# Patient Record
Sex: FEMALE | Race: WHITE | NOT HISPANIC OR LATINO | Employment: FULL TIME | URBAN - METROPOLITAN AREA
[De-identification: names, ages, dates, MRNs, and addresses within clinical notes are randomized per-mention and may not be internally consistent; named-entity substitution may affect disease eponyms.]

---

## 2017-10-01 ENCOUNTER — OFFICE VISIT (OUTPATIENT)
Dept: URGENT CARE | Facility: URGENT CARE | Age: 22
End: 2017-10-01
Payer: COMMERCIAL

## 2017-10-01 VITALS
WEIGHT: 143 LBS | RESPIRATION RATE: 16 BRPM | TEMPERATURE: 98.1 F | HEART RATE: 66 BPM | OXYGEN SATURATION: 98 % | DIASTOLIC BLOOD PRESSURE: 73 MMHG | HEIGHT: 62 IN | SYSTOLIC BLOOD PRESSURE: 122 MMHG | BODY MASS INDEX: 26.31 KG/M2

## 2017-10-01 DIAGNOSIS — R30.0 DYSURIA: Primary | ICD-10-CM

## 2017-10-01 LAB
RBC #/AREA URNS AUTO: NORMAL /HPF
URNS CMNT MICRO: NORMAL
WBC #/AREA URNS AUTO: NORMAL /HPF

## 2017-10-01 PROCEDURE — 81015 MICROSCOPIC EXAM OF URINE: CPT | Performed by: HOSPITALIST

## 2017-10-01 PROCEDURE — 99213 OFFICE O/P EST LOW 20 MIN: CPT | Performed by: HOSPITALIST

## 2017-10-01 RX ORDER — SULFAMETHOXAZOLE AND TRIMETHOPRIM 400; 80 MG/1; MG/1
1 TABLET ORAL 2 TIMES DAILY
Qty: 10 TABLET | Refills: 0 | Status: SHIPPED | OUTPATIENT
Start: 2017-10-01 | End: 2017-10-06

## 2017-10-01 NOTE — MR AVS SNAPSHOT
"              After Visit Summary   10/1/2017    Jeanna Cheung    MRN: [id number]           Patient Information     Date Of Birth          1995        Visit Information        Provider Department      10/1/2017 1:25 PM Neel Norris MD [facility]        Today's Diagnoses     Dysuria    -  1       Follow-ups after your visit        Who to contact     If you have questions or need follow up information about today's clinic visit or your schedule please contact [facility] directly at [phone].  Normal or non-critical lab and imaging results will be communicated to you by Meal Mantrahart, letter or phone within 4 business days after the clinic has received the results. If you do not hear from us within 7 days, please contact the clinic through Meal Mantrahart or phone. If you have a critical or abnormal lab result, we will notify you by phone as soon as possible.  Submit refill requests through [employer] or call your pharmacy and they will forward the refill request to us. Please allow 3 business days for your refill to be completed.          Additional Information About Your Visit        MyChart Information     [employer] lets you send messages to your doctor, view your test results, renew your prescriptions, schedule appointments and more. To sign up, go to [email or web address] . Click on \"Log in\" on the left side of the screen, which will take you to the Welcome page. Then click on \"Sign up Now\" on the right side of the page.     You will be asked to enter the access code listed below, as well as some personal information. Please follow the directions to create your username and password.     Your access code is: [email or web address]  Expires: 2017  2:54 PM     Your access code will  in 90 days. If you need help or a new code, please call your [place] clinic or [phone].        Care EveryWhere ID     This is your Care EveryWhere ID. This could be used by " "other organizations to access your [place] medical records  [id number]        Your Vitals Were     Pulse Temperature Respirations Height Last Period Pulse Oximetry    66 98.1  F (36.7  C) (Oral) 16 5' 2\" (1.575 m) 09/12/2017 98%    BMI (Body Mass Index)                   26.16 kg/m2            Blood Pressure from Last 3 Encounters:   10/01/17 122/73    Weight from Last 3 Encounters:   10/01/17 143 lb (64.9 kg)              We Performed the Following     Urine Microscopic          Today's Medication Changes          These changes are accurate as of: 10/1/17  2:54 PM.  If you have any questions, ask your nurse or doctor.               Start taking these medicines.        Dose/Directions    sulfamethoxazole-trimethoprim 400-80 MG per tablet   Commonly known as:  BACTRIM/SEPTRA   Used for:  Dysuria   Started by:  Neel Norris MD        Dose:  1 tablet   Take 1 tablet by mouth 2 times daily for 5 days   Quantity:  10 tablet   Refills:  0            Where to get your medicines      Some of these will need a paper prescription and others can be bought over the counter.  Ask your nurse if you have questions.     Bring a paper prescription for each of these medications     sulfamethoxazole-trimethoprim 400-80 MG per tablet                Primary Care Provider    None Specified       No primary provider on file.        Equal Access to Services     REUBEN PERRY AH: Dionne Argueta, jerald kiser, qatimi kaalmada luigi, huyen downey. So [facility] [phone].    ATENCIÓN: Si habla español, tiene a pradhan disposición servicios gratuitos de asistencia lingüística. Llame al [phone].    We comply with applicable federal civil rights laws and [place] laws. We do not discriminate on the basis of race, color, national origin, age, disability, sex, sexual orientation, or gender identity.            Thank you!     Thank you for choosing [facility]RG[facility]ARE  for " your care. Our goal is always to provide you with excellent care. Hearing back from our patients is one way we can continue to improve our services. Please take a few minutes to complete the written survey that you may receive in the mail after your visit with us. Thank you!             Your Updated Medication List - Protect others around you: Learn how to safely use, store and throw away your medicines at www.disposemymeds.org.          This list is accurate as of: 10/1/17  2:54 PM.  Always use your most recent med list.                   Brand Name Dispense Instructions for use Diagnosis    sulfamethoxazole-trimethoprim 400-80 MG per tablet    BACTRIM/SEPTRA    10 tablet    Take 1 tablet by mouth 2 times daily for 5 days    Dysuria

## 2017-10-01 NOTE — PROGRESS NOTES
"  Pt came here for dysuria starting last Friday, no fever or chill.     No Known Allergies    History reviewed. No pertinent past medical history.      No current outpatient prescriptions on file prior to visit.  No current facility-administered medications on file prior to visit.     Social History   Substance Use Topics     Smoking status: Never Smoker     Smokeless tobacco: Never Used     Alcohol use Not on file       ROS:  Consitutional: As above  ENT: As above  Respiratory: As above    OBJECTIVE:  /73  Pulse 66  Temp 98.1  F (36.7  C) (Oral)  Resp 16  Ht 5' 2\" (1.575 m)  Wt 143 lb (64.9 kg)  LMP 09/12/2017  SpO2 98%  BMI 26.16 kg/m2  GENERAL APPEARANCE: healthy, alert and mild to moderate  distress  EYES: conjunctiva clear  No other exam done    Recent Results (from the past 168 hour(s))   Urine Microscopic    Collection Time: 10/01/17  2:21 PM   Result Value Ref Range    WBC Urine O - 2 OTO2^O - 2 /HPF    RBC Urine O - 2 OTO2^O - 2 /HPF    Comment Urine Interfering substances, dipstick not done         ASSESSMENT:     ICD-10-CM    1. Dysuria R30.0 Urine Microscopic     sulfamethoxazole-trimethoprim (BACTRIM/SEPTRA) 400-80 MG per tablet     CANCELED: *UA reflex to Microscopic and Culture ([place] [facility] (except [place] and [place])         PLAN:    With her symptom I will treat her with bactrim bid for 5 days, tylenol prn for pain  Lots of rest and fluids.  Follow up in 5-7 days  if not better or sooner if getting worse .    Neel Norris MD        "

## 2017-10-01 NOTE — NURSING NOTE
"Chief Complaint   Patient presents with     Urgent Care     UTI     Since Friday low back and pelvic pain, pink blood, clowdy urine no previous UTI      /73  Pulse 66  Temp 98.1  F (36.7  C) (Oral)  Resp 16  Ht 5' 2\" (1.575 m)  Wt 143 lb (64.9 kg)  LMP 09/12/2017  SpO2 98%  BMI 26.16 kg/m2 Estimated body mass index is 26.16 kg/(m^2) as calculated from the following:    Height as of this encounter: 5' 2\" (1.575 m).    Weight as of this encounter: 143 lb (64.9 kg).  bp completed using cuff size: regular       Health Maintenance addressed:  NONE    n/a    Jessica Marshall MA     "

## 2017-10-28 ENCOUNTER — OFFICE VISIT (OUTPATIENT)
Dept: URGENT CARE | Facility: URGENT CARE | Age: 22
End: 2017-10-28
Payer: COMMERCIAL

## 2017-10-28 DIAGNOSIS — Z11.1 SCREENING EXAMINATION FOR PULMONARY TUBERCULOSIS: Primary | ICD-10-CM

## 2017-10-28 PROCEDURE — 99207 ZZC NO CHARGE NURSE ONLY: CPT

## 2017-10-28 NOTE — NURSING NOTE
"Jeanna Cheung;   Chief Complaint   Patient presents with     Mantoux Results Reading     patient had mantoux placed on 10/26 at [facility] - negative reading      Urgent Care     Mantoux results:   No induration.  No swelling.  No redness.    Initial LMP 09/12/2017 Estimated body mass index is 26.16 kg/(m^2) as calculated from the following:    Height as of 10/1/17: 5' 2\" (1.575 m).    Weight as of 10/1/17: 143 lb (64.9 kg)..  BP completed using cuff size NA (Not Taken).  Chaya Arora R.N."

## 2017-10-28 NOTE — MR AVS SNAPSHOT
"              After Visit Summary   10/28/2017    Jeanna Cheung    MRN: [id number]           Patient Information     Date Of Birth          1995        Visit Information        Provider Department      10/28/2017 3:05 PM Provider, [facility]        Today's Diagnoses     Screening examination for pulmonary tuberculosis    -  1       Follow-ups after your visit        Who to contact     If you have questions or need follow up information about today's clinic visit or your schedule please contact [facility] directly at [phone].  Normal or non-critical lab and imaging results will be communicated to you by Naowhart, letter or phone within 4 business days after the clinic has received the results. If you do not hear from us within 7 days, please contact the clinic through Transmensiont or phone. If you have a critical or abnormal lab result, we will notify you by phone as soon as possible.  Submit refill requests through [employer] or call your pharmacy and they will forward the refill request to us. Please allow 3 business days for your refill to be completed.          Additional Information About Your Visit        MyChart Information     [employer] lets you send messages to your doctor, view your test results, renew your prescriptions, schedule appointments and more. To sign up, go to [email or web address] . Click on \"Log in\" on the left side of the screen, which will take you to the Welcome page. Then click on \"Sign up Now\" on the right side of the page.     You will be asked to enter the access code listed below, as well as some personal information. Please follow the directions to create your username and password.     Your access code is: [email or web address]  Expires: 2017  2:54 PM     Your access code will  in 90 days. If you need help or a new code, please call your [place] clinic or [phone].        Care EveryWhere ID     This is your " Care EveryWhere ID. This could be used by other organizations to access your [place] medical records  [id number]        Your Vitals Were     Last Period                   09/12/2017            Blood Pressure from Last 3 Encounters:   10/01/17 122/73    Weight from Last 3 Encounters:   10/01/17 143 lb (64.9 kg)              Today, you had the following     No orders found for display       Primary Care Provider    Physician No Ref-Primary       NO REF-PRIMARY PHYSICIAN        Equal Access to Services     REUBEN PERRY : Hadii aad ku hadasho Soomaali, waaxda luqadaha, qaybta kaalmada adeegyada, huyen hilarioin josefan lior stevelindsayfabrizio barreto . So [facility] [phone].    ATENCIÓN: Si habla español, tiene a pradhan disposición servicios gratuitos de asistencia lingüística. Llame al [phone].    We comply with applicable federal civil rights laws and [place] laws. We do not discriminate on the basis of race, color, national origin, age, disability, sex, sexual orientation, or gender identity.            Thank you!     Thank you for choosing [facility]RG[facility]ARE  for your care. Our goal is always to provide you with excellent care. Hearing back from our patients is one way we can continue to improve our services. Please take a few minutes to complete the written survey that you may receive in the mail after your visit with us. Thank you!             Your Updated Medication List - Protect others around you: Learn how to safely use, store and throw away your medicines at www.disposemymeds.org.      Notice  As of 10/28/2017  3:33 PM    You have not been prescribed any medications.

## 2017-11-02 ENCOUNTER — OFFICE VISIT (OUTPATIENT)
Dept: URGENT CARE | Facility: URGENT CARE | Age: 22
End: 2017-11-02
Payer: COMMERCIAL

## 2017-11-02 VITALS
OXYGEN SATURATION: 99 % | SYSTOLIC BLOOD PRESSURE: 130 MMHG | DIASTOLIC BLOOD PRESSURE: 79 MMHG | TEMPERATURE: 98.5 F | BODY MASS INDEX: 26.16 KG/M2 | WEIGHT: 143 LBS | HEART RATE: 69 BPM

## 2017-11-02 DIAGNOSIS — R30.0 DYSURIA: Primary | ICD-10-CM

## 2017-11-02 LAB
ALBUMIN UR-MCNC: NEGATIVE MG/DL
APPEARANCE UR: CLEAR
BILIRUB UR QL STRIP: NEGATIVE
COLOR UR AUTO: YELLOW
GLUCOSE UR STRIP-MCNC: NEGATIVE MG/DL
HGB UR QL STRIP: ABNORMAL
KETONES UR STRIP-MCNC: NEGATIVE MG/DL
LEUKOCYTE ESTERASE UR QL STRIP: NEGATIVE
NITRATE UR QL: NEGATIVE
PH UR STRIP: 7 PH (ref 5–7)
RBC #/AREA URNS AUTO: NORMAL /HPF
SOURCE: ABNORMAL
SP GR UR STRIP: 1.01 (ref 1–1.03)
UROBILINOGEN UR STRIP-ACNC: 0.2 EU/DL (ref 0.2–1)
WBC #/AREA URNS AUTO: NORMAL /HPF

## 2017-11-02 PROCEDURE — 81001 URINALYSIS AUTO W/SCOPE: CPT | Performed by: FAMILY MEDICINE

## 2017-11-02 PROCEDURE — 99213 OFFICE O/P EST LOW 20 MIN: CPT | Performed by: PHYSICIAN ASSISTANT

## 2017-11-02 RX ORDER — NITROFURANTOIN 25; 75 MG/1; MG/1
100 CAPSULE ORAL 2 TIMES DAILY
Qty: 14 CAPSULE | Refills: 0 | Status: SHIPPED | OUTPATIENT
Start: 2017-11-02 | End: 2017-11-09

## 2017-11-02 RX ORDER — NORGESTIMATE AND ETHINYL ESTRADIOL 7DAYSX3 LO
1 KIT ORAL DAILY
COMMUNITY
End: 2021-11-12

## 2017-11-02 NOTE — MR AVS SNAPSHOT
"              After Visit Summary   2017    Jeanna Cheung    MRN: [id number]           Patient Information     Date Of Birth          1995        Visit Information        Provider Department      2017 7:25 PM Govind Lopez PA-C [facility]        Today's Diagnoses     Dysuria    -  1       Follow-ups after your visit        Who to contact     If you have questions or need follow up information about today's clinic visit or your schedule please contact [facility] directly at [phone].  Normal or non-critical lab and imaging results will be communicated to you by Sparo Labshart, letter or phone within 4 business days after the clinic has received the results. If you do not hear from us within 7 days, please contact the clinic through Yordert or phone. If you have a critical or abnormal lab result, we will notify you by phone as soon as possible.  Submit refill requests through kontoblick or call your pharmacy and they will forward the refill request to us. Please allow 3 business days for your refill to be completed.          Additional Information About Your Visit        MyChart Information     [employer] lets you send messages to your doctor, view your test results, renew your prescriptions, schedule appointments and more. To sign up, go to [email or web address] . Click on \"Log in\" on the left side of the screen, which will take you to the Welcome page. Then click on \"Sign up Now\" on the right side of the page.     You will be asked to enter the access code listed below, as well as some personal information. Please follow the directions to create your username and password.     Your access code is: [email or web address]  Expires: 2017  2:54 PM     Your access code will  in 90 days. If you need help or a new code, please call your [place] clinic or [phone].        Care EveryWhere ID     This is your Care EveryWhere ID. This could be used by other " organizations to access your [place] medical records  [id number]        Your Vitals Were     Pulse Temperature Last Period Pulse Oximetry BMI (Body Mass Index)       69 98.5  F (36.9  C) (Oral) 11/02/2017 99% 26.16 kg/m2        Blood Pressure from Last 3 Encounters:   11/02/17 130/79   10/01/17 122/73    Weight from Last 3 Encounters:   11/02/17 143 lb (64.9 kg)   10/01/17 143 lb (64.9 kg)              We Performed the Following     *UA reflex to Microscopic and Culture ([place] and [place] [facility] (except [place] and [place])     Urine Microscopic          Today's Medication Changes          These changes are accurate as of: 11/2/17  8:28 PM.  If you have any questions, ask your nurse or doctor.               Start taking these medicines.        Dose/Directions    nitroFURantoin (macrocrystal-monohydrate) 100 MG capsule   Commonly known as:  MACROBID   Used for:  Dysuria   Started by:  Govind Lopez PA-C        Dose:  100 mg   Take 1 capsule (100 mg) by mouth 2 times daily for 7 days   Quantity:  14 capsule   Refills:  0            Where to get your medicines      These medications were sent to [id number] - [place], [place] - [address] AT [facility]  [place]  [address], [place] [address]     Phone:  [phone]     nitroFURantoin (macrocrystal-monohydrate) 100 MG capsule                Primary Care Provider    Physician No Ref-Primary       NO REF-PRIMARY PHYSICIAN        Equal Access to Services     REUBEN PERRY AH: Hadii aad ku hadasho Soreggieali, waaxda luqadaha, qaybta kaalmada adeegyada, waxay nic barreto . So [facility] [phone].    ATENCIÓN: Si habla español, tiene a pradhan disposición servicios gratuitos de asistencia lingüística. Llame al [phone].    We comply with applicable federal civil rights laws and [place] laws. We do not discriminate on the basis of race, color, national origin, age, disability, sex, sexual orientation, or gender  identity.            Thank you!     Thank you for choosing [facility]RG[facility] CARE  for your care. Our goal is always to provide you with excellent care. Hearing back from our patients is one way we can continue to improve our services. Please take a few minutes to complete the written survey that you may receive in the mail after your visit with us. Thank you!             Your Updated Medication List - Protect others around you: Learn how to safely use, store and throw away your medicines at www.disposemymeds.org.          This list is accurate as of: 11/2/17  8:28 PM.  Always use your most recent med list.                   Brand Name Dispense Instructions for use Diagnosis    nitroFURantoin (macrocrystal-monohydrate) 100 MG capsule    MACROBID    14 capsule    Take 1 capsule (100 mg) by mouth 2 times daily for 7 days    Dysuria       ORTHO TRI-CYCLEN LO 0.18/0.215/0.25 MG-25 MCG per tablet   Generic drug:  norgestim-eth estrad triphasic      Take 1 tablet by mouth daily

## 2017-11-03 NOTE — PROGRESS NOTES
SUBJECTIVE:     Jeanna Cheung is a 22 year old female who  presents today for a possible UTI. Symptoms of dysuria, urgency, frequency, burning, hesitancy and suprapubic pain and pressure have been going on for 6day(s).  Hematuria yes small cots before menses.  gradual onset and still presentand moderate.  There is no history of fever, chills, nausea or vomiting.  No history of vaginal discharge. This patient does have a history of urinary tract infections.  Her last one was one month ago. Good resolution of last UTI.    Patient denies long duration, rigors, flank pain, temperature > 101 degrees F. and Vomiting, significant nausea or diarrhea or vaginal discharge     No past medical history on file.  Current Outpatient Prescriptions   Medication Sig Dispense Refill     norgestim-eth estrad triphasic (ORTHO TRI-CYCLEN LO) 0.18/0.215/0.25 MG-25 MCG per tablet Take 1 tablet by mouth daily       Social History   Substance Use Topics     Smoking status: Never Smoker     Smokeless tobacco: Never Used     Alcohol use Not on file       ROS:   Review of systems negative except as stated above.    OBJECTIVE:  /79  Pulse 69  Temp 98.5  F (36.9  C) (Oral)  Wt 143 lb (64.9 kg)  LMP 11/02/2017  SpO2 99%  BMI 26.16 kg/m2  GENERAL APPEARANCE: healthy, alert and no distress  RESP: lungs clear to auscultation - no rales, rhonchi or wheezes  CV: regular rates and rhythm, normal S1 S2, no murmur noted  ABDOMEN:  soft, nontender She has suprapubic tenderness to palpation and reproduces her sense to urinate.  BACK: No CVA tenderness  SKIN: no suspicious lesions or rashes    Lab:  Results for orders placed or performed in visit on 11/02/17   *UA reflex to Microscopic and Culture ([place] and [place] [facility] (except [place] and [place])   Result Value Ref Range    Color Urine Yellow     Appearance Urine Clear     Glucose Urine Negative NEG^Negative mg/dL    Bilirubin Urine Negative NEG^Negative    Ketones Urine Negative  NEG^Negative mg/dL    Specific Gravity Urine 1.010 1.003 - 1.035    Blood Urine Trace (A) NEG^Negative    pH Urine 7.0 5.0 - 7.0 pH    Protein Albumin Urine Negative NEG^Negative mg/dL    Urobilinogen Urine 0.2 0.2 - 1.0 EU/dL    Nitrite Urine Negative NEG^Negative    Leukocyte Esterase Urine Negative NEG^Negative    Source Midstream Urine    Urine Microscopic   Result Value Ref Range    WBC Urine O - 2 OTO2^O - 2 /HPF    RBC Urine O - 2 OTO2^O - 2 /HPF       ASSESSMENT:   Lower, uncomplicated urinary tract infection.    PLAN:  Drink plenty of fluids.    Prevention and treatment of UTI's discussed.  Signs and symptoms of pyelonephritis mentioned.   Will treat empirically based on symptoms.  Follow up with primary care physician if not improving    1. Dysuria    - *UA reflex to Microscopic and Culture ([place] and [place] [facility] (except [place] and [place])  - nitroFURantoin, macrocrystal-monohydrate, (MACROBID) 100 MG capsule; Take 1 capsule (100 mg) by mouth 2 times daily for 7 days  Dispense: 14 capsule; Refill: 0  - Urine Microscopic

## 2017-11-03 NOTE — NURSING NOTE
"Chief Complaint   Patient presents with     Urgent Care     Pt in clinic c/o recurrent dysuria     Dysuria       Initial /79  Pulse 69  Temp 98.5  F (36.9  C) (Oral)  Wt 143 lb (64.9 kg)  LMP 11/02/2017  SpO2 99%  BMI 26.16 kg/m2 Estimated body mass index is 26.16 kg/(m^2) as calculated from the following:    Height as of 10/1/17: 5' 2\" (1.575 m).    Weight as of this encounter: 143 lb (64.9 kg).  Medication Reconciliation: complete   Aparna Samayoa/ MA    "

## 2019-07-19 ENCOUNTER — COMMUNICATION - HEALTHEAST (OUTPATIENT)
Dept: TELEHEALTH | Facility: CLINIC | Age: 24
End: 2019-07-19

## 2019-07-19 ENCOUNTER — OFFICE VISIT - HEALTHEAST (OUTPATIENT)
Dept: FAMILY MEDICINE | Facility: CLINIC | Age: 24
End: 2019-07-19

## 2019-07-19 DIAGNOSIS — Z83.49 FAMILY HISTORY OF THYROID DISEASE: ICD-10-CM

## 2019-07-19 DIAGNOSIS — Z30.41 ENCOUNTER FOR SURVEILLANCE OF CONTRACEPTIVE PILLS: ICD-10-CM

## 2019-07-19 DIAGNOSIS — Z23 NEED FOR VACCINATION: ICD-10-CM

## 2019-07-19 DIAGNOSIS — Z11.3 SCREENING FOR STD (SEXUALLY TRANSMITTED DISEASE): ICD-10-CM

## 2019-07-19 DIAGNOSIS — Z12.4 SCREENING FOR CERVICAL CANCER: ICD-10-CM

## 2019-07-19 DIAGNOSIS — Z01.419 WELL FEMALE EXAM WITH ROUTINE GYNECOLOGICAL EXAM: ICD-10-CM

## 2019-07-19 ASSESSMENT — MIFFLIN-ST. JEOR: SCORE: 1310.14

## 2019-07-22 LAB
C TRACH DNA SPEC QL PROBE+SIG AMP: NEGATIVE
HPV SOURCE: NORMAL
HUMAN PAPILLOMA VIRUS 16 DNA: NEGATIVE
HUMAN PAPILLOMA VIRUS 18 DNA: NEGATIVE
HUMAN PAPILLOMA VIRUS FINAL DIAGNOSIS: NORMAL
HUMAN PAPILLOMA VIRUS OTHER HR: NEGATIVE
N GONORRHOEA DNA SPEC QL NAA+PROBE: NEGATIVE
SPECIMEN DESCRIPTION: NORMAL

## 2020-03-21 ENCOUNTER — NURSE TRIAGE (OUTPATIENT)
Dept: NURSING | Facility: CLINIC | Age: 25
End: 2020-03-21

## 2020-03-21 NOTE — TELEPHONE ENCOUNTER
"Jeanna Cheung received voicemail to call [facility] re: exposure to COVID-19.   She was exposed to a COVID-19 positive patient and has been self-quarentining since 3.13.2020.  She is not experiencing any symptoms at this time.    Instructions for Patients    Thank you for limiting contact with others, wearing a simple mask to cover your cough, practice good hand hygiene habits and accessing our virtual services where possible to limit the spread of this virus.    For more information about COVID19 and options for caring for yourself at home, please visit the CDC website at https://www.cdc.gov/coronavirus/2019-ncov/about/steps-when-sick.html  For more options for care at [facility], please visit our website at [email or web address]     Reason for Disposition    General information question, no triage required and triager able to answer question    Additional Information    Negative: [1] Caller is not with the adult (patient) AND [2] reporting urgent symptoms    Negative: Lab result questions    Negative: Medication questions    Negative: Caller can't be reached by phone    Negative: Caller has already spoken to PCP or another triager    Negative: RN needs further essential information from caller in order to complete triage    Negative: Requesting regular office appointment    Negative: [1] Caller requesting NON-URGENT health information AND [2] PCP's office is the best resource    Answer Assessment - Initial Assessment Questions  1. REASON FOR CALL or QUESTION: \"What is your reason for calling today?\" or \"How can I best help you?\" or \"What question do you have that I can help answer?\"      Calling in response to VM to call [facility].    Protocols used: INFORMATION ONLY CALL-A-AH      "

## 2020-12-27 ENCOUNTER — HEALTH MAINTENANCE LETTER (OUTPATIENT)
Age: 25
End: 2020-12-27

## 2021-05-30 NOTE — PROGRESS NOTES
Assessment/ Plan:     1. Well female exam with routine gynecological exam     2. Screening for cervical cancer  Gynecologic Cytology (PAP Smear)   3. Family history of thyroid disease     4. Screening for STD (sexually transmitted disease)  Chlamydia trachomatis & Neisseria gonorrhoeae, Amplified Detection   5. Need for vaccination  HPV vaccine 9 valent 3 dose IM    Tdap vaccine greater than or equal to 8yo IM   6. Encounter for surveillance of contraceptive pills  CRYSELLE, 28, 0.3-30 mg-mcg per tablet     Healthy female exam completed today. Discussed lifestyle modifications including weight loss, eating a balanced diet and getting regular cardiovascular exercise. Discussed self breast exams and how to complete these. Pap Smear updated today as well as screening for STDs. Plan yearly annual physicals or sooner as needed.     Birth Control refilled today.    HPV and TDAP immunization given as above.      Subjective:      Jeanna Cheung is a 24 y.o. female who presents for an annual exam. The patient is sexually active. The patient participates in regular exercise: yes. The patient reports that there is not domestic violence in her life. History of abnormal PAP this was done in 12/2017. LSIL. Will need to repeat this year. No other concerns. 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 She is working night shift.  She mentions family history of thyroid disorders.  She has had her thyroid checked in the past this has not been bothersome for her.    Healthy Habits:   Regular Exercise: Yes  Sunscreen Use: Yes  Healthy Diet: Yes  Dental Visits Regularly: Yes  Seat Belt: Yes  Sexually active: Yes  Self Breast Exam Monthly:Yes  Hemoccults: No  Flex Sig: No  Colonoscopy: No  Lipid Profile: Yes  Glucose Screen: Yes  Prevention of Osteoporosis: N/A  Last Dexa: N/A  Guns at Home:  Yes  Guns Safety Locks:  Yes and Gun safe/class:  Yes      Immunization History   Administered Date(s) Administered     HPV  9 Valent 07/19/2019     Tdap 07/19/2019     Immunization status: No records are available of immunizations, would like to immunize against HPV. She is not sure when her Tdap was last updated.     No exam data present    Gynecologic History  Patient's last menstrual period was 07/10/2019.  Contraception: oral progesterone-only contraceptive  Last Pap: 12/2017. Results were: abnormal  Last mammogram: n/a. Results were: n/a      OB History   No data available       Current Outpatient Medications   Medication Sig Dispense Refill     CRYSELLE, 28, 0.3-30 mg-mcg per tablet Take 1 tablet by mouth daily. 84 tablet 3     No current facility-administered medications for this visit.      History reviewed. No pertinent past medical history.  History reviewed. No pertinent surgical history.  Patient has no known allergies.  History reviewed. No pertinent family history.  Social History     Socioeconomic History     Marital status: Single     Spouse name: Not on file     Number of children: Not on file     Years of education: Not on file     Highest education level: Not on file   Occupational History     Not on file   Social Needs     Financial resource strain: Not on file     Food insecurity:     Worry: Not on file     Inability: Not on file     Transportation needs:     Medical: Not on file     Non-medical: Not on file   Tobacco Use     Smoking status: Never Smoker     Smokeless tobacco: Never Used   Substance and Sexual Activity     Alcohol use: Not on file     Drug use: Not on file     Sexual activity: Not on file   Lifestyle     Physical activity:     Days per week: Not on file     Minutes per session: Not on file     Stress: Not on file   Relationships     Social connections:     Talks on phone: Not on file     Gets together: Not on file     Attends Alevism service: Not on file     Active member of club or organization: Not on file     Attends meetings of clubs or organizations: Not on file     Relationship status: Not on  "file     Intimate partner violence:     Fear of current or ex partner: Not on file     Emotionally abused: Not on file     Physically abused: Not on file     Forced sexual activity: Not on file   Other Topics Concern     Not on file   Social History Narrative     Not on file       Review of Systems  General:  Denies problem  Eyes: Denies problem  Ears/Nose/Throat: Denies problem  Cardiovascular: Denies problem  Respiratory:  Denies problem  Gastrointestinal:  Denies problem, Genitourinary: Denies problem  Musculoskeletal:  Denies problem  Skin: Denies problem  Neurologic: Denies problem  Psychiatric: Denies problem  Endocrine: Denies problem  Heme/Lymphatic: Denies problem   Allergic/Immunologic: Denies problem        Objective:         Vitals:    07/19/19 1427   BP: 110/72   Pulse: 60   Resp: 16   Weight: 136 lb (61.7 kg)   Height: 5' 2\" (1.575 m)     Body mass index is 24.87 kg/m .    Physical Exam:  General Appearance: Alert, cooperative, no distress, appears stated age  Head: Normocephalic, without obvious abnormality, atraumatic  Eyes: PERRL, conjunctiva/corneas clear, EOM's intact  Ears: Normal TM's and external ear canals, both ears  Nose: Nares normal, septum midline,mucosa normal, no drainage  Throat: Lips, mucosa, and tongue normal; teeth and gums normal  Neck: Supple, symmetrical, trachea midline, no adenopathy;  thyroid: not enlarged, symmetric, no tenderness/mass/nodules; no carotid bruit or JVD  Back: Symmetric, no curvature, ROM normal, no CVA tenderness  Lungs: Clear to auscultation bilaterally, respirations unlabored  Breasts: No breast masses, tenderness, asymmetry, or nipple discharge.  Heart: Regular rate and rhythm, S1 and S2 normal, no murmur, rub, or gallop  Abdomen: Soft, non-tender, bowel sounds active all four quadrants,  no masses, no organomegaly  Pelvic:Normally developed genitalia with no external lesions or eruptions. Vagina and cervix show no lesions, inflammation, discharge or " tenderness. No cystocele, No rectocele. Uterus non-tender, mobile.  No adnexal mass or tenderness.  Extremities: Extremities normal, atraumatic, no cyanosis or edema  Skin: Skin color, texture, turgor normal, no rashes or lesions  Lymph nodes: Cervical, supraclavicular, and axillary nodes normal  Neurologic: Normal

## 2021-06-03 VITALS — HEIGHT: 62 IN | WEIGHT: 136 LBS | BODY MASS INDEX: 25.03 KG/M2

## 2021-07-20 ENCOUNTER — OFFICE VISIT (OUTPATIENT)
Dept: FAMILY MEDICINE | Facility: CLINIC | Age: 26
End: 2021-07-20
Payer: COMMERCIAL

## 2021-07-20 VITALS
SYSTOLIC BLOOD PRESSURE: 118 MMHG | OXYGEN SATURATION: 98 % | WEIGHT: 145 LBS | RESPIRATION RATE: 16 BRPM | TEMPERATURE: 98.4 F | HEIGHT: 62 IN | DIASTOLIC BLOOD PRESSURE: 84 MMHG | BODY MASS INDEX: 26.68 KG/M2 | HEART RATE: 67 BPM

## 2021-07-20 DIAGNOSIS — L03.116 CELLULITIS OF LEFT LOWER EXTREMITY: Primary | ICD-10-CM

## 2021-07-20 PROCEDURE — 99213 OFFICE O/P EST LOW 20 MIN: CPT | Performed by: FAMILY MEDICINE

## 2021-07-20 RX ORDER — CEPHALEXIN 500 MG/1
500 CAPSULE ORAL 3 TIMES DAILY
Qty: 30 CAPSULE | Refills: 0 | Status: SHIPPED | OUTPATIENT
Start: 2021-07-20 | End: 2021-07-30

## 2021-07-20 ASSESSMENT — ENCOUNTER SYMPTOMS
FATIGUE: 1
FEVER: 0
CARDIOVASCULAR NEGATIVE: 1
EYES NEGATIVE: 1
COLOR CHANGE: 1
GASTROINTESTINAL NEGATIVE: 1
MUSCULOSKELETAL NEGATIVE: 1
PSYCHIATRIC NEGATIVE: 1
HEMATOLOGIC/LYMPHATIC NEGATIVE: 1
RESPIRATORY NEGATIVE: 1
ALLERGIC/IMMUNOLOGIC NEGATIVE: 1
NEUROLOGICAL NEGATIVE: 1
ENDOCRINE NEGATIVE: 1

## 2021-07-20 ASSESSMENT — MIFFLIN-ST. JEOR: SCORE: 1354.94

## 2021-07-20 NOTE — PROGRESS NOTES
Assessment & Plan     Cellulitis of left lower extremity  Antibiotics faxed. Recommend ice packs, Also recommend Ibuprofen as needed.   - cephALEXin (KEFLEX) 500 MG capsule; Take 1 capsule (500 mg) by mouth 3 times daily for 10 days      FUTURE APPOINTMENTS:       - Follow-up visit in 1-2 weeks or sooner as needed.  Patient Instructions         Thank you for choosing [facility].  You may be receiving an email and/or telephone survey request from [facility] Customer Experience regarding your visit today.  Please take a few minutes to respond to the survey to let us know how we are doing.      If you have questions or concerns, please contact us via [employer] or you can contact your care team at [phone] option 2.    Our Clinic hours are:  Monday - Thursday 7am-6pm  Friday 7am-5pm    The [place] outpatient lab hours are:  Monday - Friday 7am-4:30pm    Appointments are required, call [phone]    If you have clinical questions after hours or would like to schedule an appointment,  call the clinic at [phone].    Patient Education     Discharge Instructions for Cellulitis   You have been diagnosed with cellulitis. This is an infection in the deepest layer of the skin and tissue beneath the skin. In some cases, the infection also affects the muscle. Cellulitis is caused by bacteria. The bacteria can enter the body through broken skin. This can happen with a cut, scratch, animal bite, or an insect bite that has been scratched. You may have been treated in the hospital with antibiotics and fluids. You will likely be given a prescription for antibiotics to take at home. This sheet will help you take care of yourself at home.  Home care  When you are home:    Take the prescribed antibiotic medicine you are given as directed until it is gone. Take it even if you feel better. It treats the infection and stops it from returning. Not taking all the medicine can make future infections hard to treat.    Keep the  infected area clean.    When possible, raise the infected area above the level of your heart. This helps keep swelling down.    Talk with your healthcare provider if you are in pain. Ask what kind of over-the-counter medicine you can take for pain.    Apply clean bandages as advised.    Take your temperature once a day for a week.    Wash your hands often to prevent spreading the infection.  In the future, wash your hands before and after you touch cuts, scratches, or bandages. This will help prevent infection.   When to call your healthcare provider  Call your healthcare provider right away if you have any of the following:    Trouble or pain when moving the joints above or below the infected area    Discharge or pus draining from the area    Fever of 100.4 F (38 C) or higher, or as directed by your healthcare provider    Pain that gets worse in or around the infected     Redness that gets worse in or around the infected area, particularly if the area of redness expands to a wider area    Shaking chills    Swelling of the infected area    Vomiting  [employer] last reviewed this educational content on 11/1/2019 2000-2021 [employer]. All rights reserved. This information is not intended as a substitute for professional medical care. Always follow your healthcare professional's instructions.               Return in about 1 week (around 7/27/2021), or if symptoms worsen or fail to improve, for Follow up.    Russell Acosta MD  [facility]    Elisabeth Meeks is a 26 year old who presents for the following health issues  accompanied by herself:    HPI     Concern - Possible Cellulitis  Onset: Started on Sunday from a bug bite she thinks.  Description: Redness and swelling on the back of the upper left thigh.  Intensity: mild  Progression of Symptoms:  worsening  Accompanying Signs & Symptoms: The area feels warm, she feels fatigue.  Area keeps getting uncomfortable.  No  "fever or chills.  Possibly some drainage about 24 hours ago.  Previous history of similar problem: Does have swelling with insect bites.  Not usually like this.  Precipitating factors:        Worsened by: Sitting and pressure on the area.  Alleviating factors:        Improved by: Ice pack.  Therapies tried and outcome: Ice pack as needed.    History as above, here for possible cellulitis. Was bit by an insect over the weekend and developed redness and warmth behind her left thigh. Redness and warmth continue to get worse. She reports that she has felt somewhat tired but denies any fevers or chills.     Review of Systems   Constitutional: Positive for fatigue. Negative for fever.   HENT: Negative.    Eyes: Negative.    Respiratory: Negative.    Cardiovascular: Negative.    Gastrointestinal: Negative.    Endocrine: Negative.    Breasts:  negative.    Genitourinary: Negative.    Musculoskeletal: Negative.    Skin: Positive for color change.   Allergic/Immunologic: Negative.    Neurological: Negative.    Hematological: Negative.    Psychiatric/Behavioral: Negative.             Objective    /84   Pulse 67   Temp 98.4  F (36.9  C) (Tympanic)   Resp 16   Ht 1.581 m (5' 2.25\")   Wt 65.8 kg (145 lb)   SpO2 98%   BMI 26.31 kg/m    Body mass index is 26.31 kg/m .  Physical Exam  Constitutional:       Appearance: Normal appearance.   HENT:      Head: Normocephalic and atraumatic.   Eyes:      Extraocular Movements: Extraocular movements intact.      Pupils: Pupils are equal, round, and reactive to light.   Cardiovascular:      Rate and Rhythm: Normal rate and regular rhythm.      Pulses: Normal pulses.      Heart sounds: Normal heart sounds.   Pulmonary:      Effort: Pulmonary effort is normal.      Breath sounds: Normal breath sounds.   Musculoskeletal:         General: Normal range of motion.   Skin:     General: Skin is warm and dry.      Findings: Erythema present.             Comments: Area of erythema and " warmth behind left thigh    Neurological:      Mental Status: She is alert and oriented to person, place, and time.   Psychiatric:         Mood and Affect: Mood normal.         Behavior: Behavior normal.

## 2021-07-20 NOTE — PATIENT INSTRUCTIONS
Thank you for choosing [facility].  You may be receiving an email and/or telephone survey request from [facility] Customer Experience regarding your visit today.  Please take a few minutes to respond to the survey to let us know how we are doing.      If you have questions or concerns, please contact us via [employer] or you can contact your care team at [phone] option 2.    Our Clinic hours are:  Monday - Thursday 7am-6pm  Friday 7am-5pm    The [place] outpatient lab hours are:  Monday - Friday 7am-4:30pm    Appointments are required, call [phone]    If you have clinical questions after hours or would like to schedule an appointment,  call the clinic at [phone].    Patient Education     Discharge Instructions for Cellulitis   You have been diagnosed with cellulitis. This is an infection in the deepest layer of the skin and tissue beneath the skin. In some cases, the infection also affects the muscle. Cellulitis is caused by bacteria. The bacteria can enter the body through broken skin. This can happen with a cut, scratch, animal bite, or an insect bite that has been scratched. You may have been treated in the hospital with antibiotics and fluids. You will likely be given a prescription for antibiotics to take at home. This sheet will help you take care of yourself at home.  Home care  When you are home:    Take the prescribed antibiotic medicine you are given as directed until it is gone. Take it even if you feel better. It treats the infection and stops it from returning. Not taking all the medicine can make future infections hard to treat.    Keep the infected area clean.    When possible, raise the infected area above the level of your heart. This helps keep swelling down.    Talk with your healthcare provider if you are in pain. Ask what kind of over-the-counter medicine you can take for pain.    Apply clean bandages as advised.    Take your temperature once a day for a week.    Wash  your hands often to prevent spreading the infection.  In the future, wash your hands before and after you touch cuts, scratches, or bandages. This will help prevent infection.   When to call your healthcare provider  Call your healthcare provider right away if you have any of the following:    Trouble or pain when moving the joints above or below the infected area    Discharge or pus draining from the area    Fever of 100.4 F (38 C) or higher, or as directed by your healthcare provider    Pain that gets worse in or around the infected     Redness that gets worse in or around the infected area, particularly if the area of redness expands to a wider area    Shaking chills    Swelling of the infected area    Vomiting  [employer] last reviewed this educational content on 11/1/2019 2000-2021 [employer]. All rights reserved. This information is not intended as a substitute for professional medical care. Always follow your healthcare professional's instructions.

## 2021-10-09 ENCOUNTER — HEALTH MAINTENANCE LETTER (OUTPATIENT)
Age: 26
End: 2021-10-09

## 2021-11-12 ENCOUNTER — OFFICE VISIT (OUTPATIENT)
Dept: FAMILY MEDICINE | Facility: CLINIC | Age: 26
End: 2021-11-12
Payer: COMMERCIAL

## 2021-11-12 VITALS
DIASTOLIC BLOOD PRESSURE: 75 MMHG | HEIGHT: 62 IN | WEIGHT: 147 LBS | BODY MASS INDEX: 27.05 KG/M2 | HEART RATE: 65 BPM | RESPIRATION RATE: 14 BRPM | OXYGEN SATURATION: 99 % | TEMPERATURE: 97.8 F | SYSTOLIC BLOOD PRESSURE: 124 MMHG

## 2021-11-12 DIAGNOSIS — Z83.49 FAMILY HISTORY OF THYROID DISEASE: ICD-10-CM

## 2021-11-12 DIAGNOSIS — Z23 NEED FOR VACCINATION: ICD-10-CM

## 2021-11-12 DIAGNOSIS — Z13.0 SCREENING FOR DEFICIENCY ANEMIA: ICD-10-CM

## 2021-11-12 DIAGNOSIS — Z30.41 ENCOUNTER FOR SURVEILLANCE OF CONTRACEPTIVE PILLS: ICD-10-CM

## 2021-11-12 DIAGNOSIS — Z13.1 SCREENING FOR DIABETES MELLITUS: ICD-10-CM

## 2021-11-12 DIAGNOSIS — Z11.59 NEED FOR HEPATITIS C SCREENING TEST: ICD-10-CM

## 2021-11-12 DIAGNOSIS — Z11.4 SCREENING FOR HIV (HUMAN IMMUNODEFICIENCY VIRUS): ICD-10-CM

## 2021-11-12 DIAGNOSIS — Z00.00 ROUTINE GENERAL MEDICAL EXAMINATION AT A HEALTH CARE FACILITY: Primary | ICD-10-CM

## 2021-11-12 DIAGNOSIS — Z13.220 SCREENING FOR HYPERLIPIDEMIA: ICD-10-CM

## 2021-11-12 LAB
CHOLEST SERPL-MCNC: 162 MG/DL
FASTING STATUS PATIENT QL REPORTED: NO
FASTING STATUS PATIENT QL REPORTED: NO
GLUCOSE BLD-MCNC: 126 MG/DL (ref 70–99)
HCV AB SERPL QL IA: NONREACTIVE
HDLC SERPL-MCNC: 72 MG/DL
HGB BLD-MCNC: 14.1 G/DL (ref 11.7–15.7)
HIV 1+2 AB+HIV1 P24 AG SERPL QL IA: NONREACTIVE
HOLD SPECIMEN: NORMAL
LDLC SERPL CALC-MCNC: 71 MG/DL
NONHDLC SERPL-MCNC: 90 MG/DL
TRIGL SERPL-MCNC: 94 MG/DL
TSH SERPL DL<=0.005 MIU/L-ACNC: 1.11 MU/L (ref 0.4–4)

## 2021-11-12 PROCEDURE — 36415 COLL VENOUS BLD VENIPUNCTURE: CPT | Performed by: NURSE PRACTITIONER

## 2021-11-12 PROCEDURE — 90471 IMMUNIZATION ADMIN: CPT | Performed by: NURSE PRACTITIONER

## 2021-11-12 PROCEDURE — 80061 LIPID PANEL: CPT | Performed by: NURSE PRACTITIONER

## 2021-11-12 PROCEDURE — 83036 HEMOGLOBIN GLYCOSYLATED A1C: CPT | Performed by: NURSE PRACTITIONER

## 2021-11-12 PROCEDURE — 86803 HEPATITIS C AB TEST: CPT | Performed by: NURSE PRACTITIONER

## 2021-11-12 PROCEDURE — 87389 HIV-1 AG W/HIV-1&-2 AB AG IA: CPT | Performed by: NURSE PRACTITIONER

## 2021-11-12 PROCEDURE — 84443 ASSAY THYROID STIM HORMONE: CPT | Performed by: NURSE PRACTITIONER

## 2021-11-12 PROCEDURE — 99395 PREV VISIT EST AGE 18-39: CPT | Mod: 25 | Performed by: NURSE PRACTITIONER

## 2021-11-12 PROCEDURE — 90651 9VHPV VACCINE 2/3 DOSE IM: CPT | Performed by: NURSE PRACTITIONER

## 2021-11-12 PROCEDURE — 82947 ASSAY GLUCOSE BLOOD QUANT: CPT | Performed by: NURSE PRACTITIONER

## 2021-11-12 PROCEDURE — 85018 HEMOGLOBIN: CPT | Performed by: NURSE PRACTITIONER

## 2021-11-12 RX ORDER — NORGESTIMATE AND ETHINYL ESTRADIOL 7DAYSX3 LO
1 KIT ORAL DAILY
Qty: 84 TABLET | Refills: 4 | Status: SHIPPED | OUTPATIENT
Start: 2021-11-12 | End: 2021-12-13

## 2021-11-12 ASSESSMENT — ENCOUNTER SYMPTOMS
SORE THROAT: 0
DYSURIA: 0
FREQUENCY: 0
ARTHRALGIAS: 0
HEARTBURN: 0
FEVER: 0
HEMATOCHEZIA: 0
JOINT SWELLING: 0
EYE PAIN: 0
ABDOMINAL PAIN: 0
CONSTIPATION: 0
SHORTNESS OF BREATH: 0
DIARRHEA: 0
HEADACHES: 0
DIZZINESS: 1
PARESTHESIAS: 0
WEAKNESS: 0
MYALGIAS: 0
COUGH: 0
HEMATURIA: 0
PALPITATIONS: 0
NAUSEA: 0
NERVOUS/ANXIOUS: 0
CHILLS: 0

## 2021-11-12 ASSESSMENT — MIFFLIN-ST. JEOR: SCORE: 1364.01

## 2021-11-12 ASSESSMENT — PAIN SCALES - GENERAL: PAINLEVEL: NO PAIN (0)

## 2021-11-12 NOTE — PATIENT INSTRUCTIONS

## 2021-11-12 NOTE — PROGRESS NOTES
SUBJECTIVE:   CC: Jeanna Cheung is an 26 year old woman who presents for preventive health visit.       Patient has been advised of split billing requirements and indicates understanding: Yes  Healthy Habits:     Getting at least 3 servings of Calcium per day:  Yes    Bi-annual eye exam:  Yes    Dental care twice a year:  Yes    Sleep apnea or symptoms of sleep apnea:  None    Diet:  Regular (no restrictions)    Frequency of exercise:  2-3 days/week    Duration of exercise:  45-60 minutes    Taking medications regularly:  Yes    Medication side effects:  Lightheadedness    PHQ-2 Total Score: 1    Additional concerns today:  No      Today's PHQ-2 Score:   PHQ-2 ( 1999 [employer]) 11/12/2021   Q1: Little interest or pleasure in doing things 1   Q2: Feeling down, depressed or hopeless 0   PHQ-2 Score 1   Q1: Little interest or pleasure in doing things Several days   Q2: Feeling down, depressed or hopeless Not at all   PHQ-2 Score 1       Abuse: Current or Past (Physical, Sexual or Emotional) - No  Do you feel safe in your environment? Yes    Have you ever done Advance Care Planning? (For example, a Health Directive, POLST, or a discussion with a medical provider or your loved ones about your wishes): No, advance care planning information given to patient to review.  Advanced care planning was discussed at today's visit.    Social History     Tobacco Use     Smoking status: Never Smoker     Smokeless tobacco: Never Used   Substance Use Topics     Alcohol use: Not Currently     If you drink alcohol do you typically have >3 drinks per day or >7 drinks per week? No    Alcohol Use 11/12/2021   Prescreen: >3 drinks/day or >7 drinks/week? No       Reviewed orders with patient.  Reviewed health maintenance and updated orders accordingly - Yes  BP Readings from Last 3 Encounters:   11/12/21 124/75   07/20/21 118/84   11/02/17 130/79    Wt Readings from Last 3 Encounters:   11/12/21 66.7 kg (147 lb)   07/20/21 65.8 kg (145  lb)   07/19/19 61.7 kg (136 lb)                  There is no problem list on file for this patient.    History reviewed. No pertinent surgical history.    Social History     Tobacco Use     Smoking status: Never Smoker     Smokeless tobacco: Never Used   Substance Use Topics     Alcohol use: Not Currently     Family History   Problem Relation Age of Onset     Heart Disease Father         Angioplasty         Current Outpatient Medications   Medication Sig Dispense Refill     norgestim-eth estrad triphasic (ORTHO TRI-CYCLEN LO) 0.18/0.215/0.25 MG-25 MCG per tablet Take 1 tablet by mouth daily       No Known Allergies    Breast Cancer Screening:    FHS-7:   Breast CA Risk Assessment (FHS-7) 11/12/2021   Did any of your first-degree relatives have breast or ovarian cancer? No   Did any of your relatives have bilateral breast cancer? Yes       Patient under 40 years of age: Routine Mammogram Screening not recommended.   Pertinent mammograms are reviewed under the imaging tab.    History of abnormal Pap smear: NO - age 21-29 PAP every 3 years recommended  PAP / HPV Latest Ref Rng & Units 7/19/2019   PAP Negative for squamous intraepithelial lesion or malignancy. Negative for squamous intraepithelial lesion or malignancy  Electronically signed by Marina Coronel CT (ASCP) on 7/30/2019 at  8:22 AM     HPV16 NEG Negative   HPV18 NEG Negative   HRHPV NEG Negative     Reviewed and updated as needed this visit by clinical staff  Tobacco  Allergies  Meds   Med Hx  Surg Hx  Fam Hx  Soc Hx       Reviewed and updated as needed this visit by Provider               History reviewed. No pertinent past medical history.   History reviewed. No pertinent surgical history.    Review of Systems  CONSTITUTIONAL: NEGATIVE for fever, chills, change in weight  INTEGUMENTARU/SKIN: NEGATIVE for worrisome rashes, moles or lesions  EYES: NEGATIVE for vision changes or irritation  ENT: NEGATIVE for ear, mouth and throat problems  RESP:  "NEGATIVE for significant cough or SOB  BREAST: NEGATIVE for masses, tenderness or discharge  CV: NEGATIVE for chest pain, palpitations or peripheral edema  GI: NEGATIVE for nausea, abdominal pain, heartburn, or change in bowel habits  : NEGATIVE for unusual urinary or vaginal symptoms. Periods are regular.  MUSCULOSKELETAL: NEGATIVE for significant arthralgias or myalgia  NEURO: NEGATIVE for weakness, dizziness or paresthesias  PSYCHIATRIC: NEGATIVE for changes in mood or affect     OBJECTIVE:   /75 (BP Location: Right arm, Patient Position: Chair, Cuff Size: Adult Regular)   Pulse 65   Temp 97.8  F (36.6  C) (Tympanic)   Resp 14   Ht 1.581 m (5' 2.25\")   Wt 66.7 kg (147 lb)   LMP 10/31/2021 (Exact Date)   SpO2 99%   Breastfeeding No   BMI 26.67 kg/m       Physical Exam  GENERAL: healthy, alert and no distress  EYES: Eyes grossly normal to inspection, PERRL and conjunctivae and sclerae normal  HENT: ear canals and TM's normal, nose and mouth without ulcers or lesions  NECK: no adenopathy, no asymmetry, masses, or scars and thyroid normal to palpation  RESP: lungs clear to auscultation - no rales, rhonchi or wheezes  BREAST: normal without masses, tenderness or nipple discharge and no palpable axillary masses or adenopathy  CV: regular rate and rhythm, normal S1 S2, no S3 or S4, no murmur, click or rub, no peripheral edema and peripheral pulses strong  ABDOMEN: soft, nontender, no hepatosplenomegaly, no masses and bowel sounds normal  MS: no gross musculoskeletal defects noted, no edema  SKIN: no suspicious lesions or rashes  NEURO: Normal strength and tone, mentation intact and speech normal  PSYCH: mentation appears normal, affect normal/bright        ASSESSMENT/PLAN:       ICD-10-CM    1. Routine general medical examination at a health care facility  Z00.00    2. Need for vaccination  Z23 HUMAN PAPILLOMA VIRUS (GARDASIL 9) VACCINE [[id number]]   3. Screening for hyperlipidemia  Z13.220 Lipid panel " "reflex to direct LDL Fasting     Lipid panel reflex to direct LDL Fasting   4. Screening for deficiency anemia  Z13.0 Hemoglobin     Hemoglobin   5. Screening for diabetes mellitus  Z13.1 Glucose     Glucose   6. Screening for HIV (human immunodeficiency virus)  Z11.4 HIV Antigen Antibody Combo   7. Need for hepatitis C screening test  Z11.59 Hepatitis C Screen Reflex to HCV RNA Quant and Genotype   8. Encounter for surveillance of contraceptive pills  Z30.41 norgestim-eth estrad triphasic (ORTHO TRI-CYCLEN LO) 0.18/0.215/0.25 MG-25 MCG tablet   9. Family history of thyroid disease  Z83.49 TSH with free T4 reflex     TSH with free T4 reflex     Healthy female exam completed today. Discussed lifestyle modifications including weight loss/ management, eating a balanced diet, and getting regular cardiovascular exercise. Discussed self breast exams and how to complete these. Pap Smear reviewed due next year. Labs updated today. Plan yearly annual physicals or sooner as needed.      Patient has been advised of split billing requirements and indicates understanding: Yes  COUNSELING:  Reviewed preventive health counseling, as reflected in patient instructions       Regular exercise       Healthy diet/nutrition       Family planning       Safe sex practices/STD prevention       Consider Hep C screening for all patients one time for ages 18-79 years       HIV screeninx in teen years, 1x in adult years, and at intervals if high risk    Estimated body mass index is 26.67 kg/m  as calculated from the following:    Height as of this encounter: 1.581 m (5' 2.25\").    Weight as of this encounter: 66.7 kg (147 lb).    She reports that she has never smoked. She has never used smokeless tobacco.      Counseling Resources:  ATP IV Guidelines  Pooled Cohorts Equation Calculator  Breast Cancer Risk Calculator  BRCA-Related Cancer Risk Assessment: FHS-7 Tool  FRAX Risk Assessment  ICSI Preventive Guidelines  Dietary Guidelines for " Americans, 2010  USDA's MyPlate  ASA Prophylaxis  Lung CA Screening    Citlaly Hawkins, LASHONDA CNP  [facility]

## 2021-11-15 LAB — HBA1C MFR BLD: 5.1 % (ref 0–5.6)

## 2021-11-15 NOTE — RESULT ENCOUNTER NOTE
Zack Meeks,  Labs completed in clinic are within normal limits. Cholesterol looks great. No signs of anemia.Your blood sugar was elevated which is surprising. I am going to rule out diabetes with an A1c. Let me know if you have any questions.   LASHONDA Sewell CNP

## 2021-12-13 ENCOUNTER — MYC MEDICAL ADVICE (OUTPATIENT)
Dept: FAMILY MEDICINE | Facility: CLINIC | Age: 26
End: 2021-12-13
Payer: COMMERCIAL

## 2021-12-13 DIAGNOSIS — Z30.41 ENCOUNTER FOR SURVEILLANCE OF CONTRACEPTIVE PILLS: Primary | ICD-10-CM

## 2021-12-13 RX ORDER — NORGESTREL AND ETHINYL ESTRADIOL 0.3-0.03MG
KIT ORAL
COMMUNITY
Start: 2021-09-21 | End: 2021-12-13

## 2021-12-13 RX ORDER — NORGESTREL AND ETHINYL ESTRADIOL 0.3-0.03MG
1 KIT ORAL DAILY
Qty: 84 TABLET | Refills: 3 | Status: SHIPPED | OUTPATIENT
Start: 2021-12-13 | End: 2023-02-07

## 2021-12-25 ENCOUNTER — APPOINTMENT (OUTPATIENT)
Dept: URGENT CARE | Facility: CLINIC | Age: 26
End: 2021-12-25
Payer: COMMERCIAL

## 2022-06-05 ENCOUNTER — MYC MEDICAL ADVICE (OUTPATIENT)
Dept: FAMILY MEDICINE | Facility: CLINIC | Age: 27
End: 2022-06-05
Payer: COMMERCIAL

## 2022-06-06 NOTE — TELEPHONE ENCOUNTER
A virtual visit would be appropriate for this. She could be seen in person as well.   LASHONDA Sewell CNP

## 2022-06-06 NOTE — TELEPHONE ENCOUNTER
Citlaly,     I am sure an appointment is needed but leaving this up to you to decide.     Angie Stewart, YARELIN, RN, PHN

## 2022-09-17 ENCOUNTER — HEALTH MAINTENANCE LETTER (OUTPATIENT)
Age: 27
End: 2022-09-17

## 2022-11-14 ENCOUNTER — OFFICE VISIT (OUTPATIENT)
Dept: FAMILY MEDICINE | Facility: CLINIC | Age: 27
End: 2022-11-14
Payer: COMMERCIAL

## 2022-11-14 VITALS
HEIGHT: 63 IN | OXYGEN SATURATION: 99 % | DIASTOLIC BLOOD PRESSURE: 70 MMHG | TEMPERATURE: 97.8 F | WEIGHT: 149 LBS | RESPIRATION RATE: 14 BRPM | HEART RATE: 68 BPM | SYSTOLIC BLOOD PRESSURE: 122 MMHG | BODY MASS INDEX: 26.4 KG/M2

## 2022-11-14 DIAGNOSIS — Z12.4 CERVICAL CANCER SCREENING: ICD-10-CM

## 2022-11-14 DIAGNOSIS — Z30.09 ENCOUNTER FOR COUNSELING REGARDING CONTRACEPTION: ICD-10-CM

## 2022-11-14 DIAGNOSIS — Z00.00 ROUTINE GENERAL MEDICAL EXAMINATION AT A HEALTH CARE FACILITY: Primary | ICD-10-CM

## 2022-11-14 DIAGNOSIS — N64.4 BREAST TENDERNESS: ICD-10-CM

## 2022-11-14 PROCEDURE — G0145 SCR C/V CYTO,THINLAYER,RESCR: HCPCS | Performed by: NURSE PRACTITIONER

## 2022-11-14 PROCEDURE — 99395 PREV VISIT EST AGE 18-39: CPT | Performed by: NURSE PRACTITIONER

## 2022-11-14 PROCEDURE — 87624 HPV HI-RISK TYP POOLED RSLT: CPT | Performed by: NURSE PRACTITIONER

## 2022-11-14 PROCEDURE — 99213 OFFICE O/P EST LOW 20 MIN: CPT | Mod: 25 | Performed by: NURSE PRACTITIONER

## 2022-11-14 ASSESSMENT — ENCOUNTER SYMPTOMS
DIARRHEA: 0
SORE THROAT: 0
WEAKNESS: 0
NAUSEA: 1
MYALGIAS: 0
PALPITATIONS: 0
EYE PAIN: 0
FREQUENCY: 0
CHILLS: 0
COUGH: 0
DYSURIA: 0
BREAST MASS: 0
PARESTHESIAS: 0
HEMATOCHEZIA: 0
SHORTNESS OF BREATH: 0
ABDOMINAL PAIN: 0
DIZZINESS: 1
CONSTIPATION: 0
NERVOUS/ANXIOUS: 0
HEARTBURN: 0
JOINT SWELLING: 0
HEMATURIA: 0
FEVER: 0
ARTHRALGIAS: 0
HEADACHES: 1

## 2022-11-14 ASSESSMENT — PAIN SCALES - GENERAL: PAINLEVEL: MODERATE PAIN (5)

## 2022-11-14 NOTE — PROGRESS NOTES
SUBJECTIVE:   CC: Jeanna is an 27 year old who presents for preventive health visit.     Patient has been advised of split billing requirements and indicates understanding: Yes     Healthy Habits:     Getting at least 3 servings of Calcium per day:  Yes    Bi-annual eye exam:  Yes    Dental care twice a year:  Yes    Sleep apnea or symptoms of sleep apnea:  None    Diet:  Regular (no restrictions)    Frequency of exercise:  2-3 days/week    Duration of exercise:  45-60 minutes    Taking medications regularly:  Yes    Medication side effects:  None    PHQ-2 Total Score: 0    Additional concerns today:  Yes    Chief Complaint   Patient presents with     Physical     Contraception     Patient would like to discuss a more long-term option: Nexplanon or an IUD      Breast Problem     Patient has a consistent spot of discomfort/pain in her left breast.  Had episodes of this in high school, but was never pursued by doctor at the time.  Discomfort is happening again almost daily again.         Today's PHQ-2 Score:   PHQ-2 ( 1999 Pfizer) 11/14/2022   Q1: Little interest or pleasure in doing things 0   Q2: Feeling down, depressed or hopeless 0   PHQ-2 Score 0   PHQ-2 Total Score (12-17 Years)- Positive if 3 or more points; Administer PHQ-A if positive -   Q1: Little interest or pleasure in doing things Several days   Q2: Feeling down, depressed or hopeless Not at all   PHQ-2 Score 1       Abuse: Current or Past (Physical, Sexual or Emotional) - No  Do you feel safe in your environment? Yes      Social History     Tobacco Use     Smoking status: Never     Smokeless tobacco: Never   Substance Use Topics     Alcohol use: Not Currently     If you drink alcohol do you typically have >3 drinks per day or >7 drinks per week? No    Alcohol Use 11/14/2022   Prescreen: >3 drinks/day or >7 drinks/week? No   Prescreen: >3 drinks/day or >7 drinks/week? -       Reviewed orders with patient.  Reviewed health maintenance and updated orders  accordingly - Yes  Labs reviewed in EPIC    Breast Cancer Screening:    FHS-7:   Breast CA Risk Assessment (FHS-7) 11/12/2021 11/14/2022   Did any of your first-degree relatives have breast or ovarian cancer? No No   Did any of your relatives have bilateral breast cancer? Yes Yes   Did any man in your family have breast cancer? - No   Did any woman in your family have breast and ovarian cancer? - Yes   Did any woman in your family have breast cancer before age 50 y? - No   Do you have 2 or more relatives with breast and/or ovarian cancer? - No   Do you have 2 or more relatives with breast and/or bowel cancer? - No       Patient under 40 years of age: Routine Mammogram Screening not recommended.   Pertinent mammograms are reviewed under the imaging tab.    History of abnormal Pap smear: YES - other categories - see link Cervical Cytology Screening Guidelines . She reports an abnormal result while in college (likely [id number]), though unsure of result. Possible LSIL  PAP / HPV Latest Ref Rng & Units 7/19/2019   PAP Negative for squamous intraepithelial lesion or malignancy. Negative for squamous intraepithelial lesion or malignancy  Electronically signed by Marina Coronel CT (ASCP) on 7/30/2019 at  8:22 AM     HPV16 NEG Negative   HPV18 NEG Negative   HRHPV NEG Negative     Reviewed and updated as needed this visit by clinical staff   Tobacco  Allergies  Meds   Med Hx  Surg Hx  Fam Hx  Soc Hx        Reviewed and updated as needed this visit by Provider         Ryder Allred            Jeanna presents to clinic for a routine physical, contraceptive counseling, and concern of left breast tenderness.    She just got  a month ago and anticipates wanting to try for pregnancy in about two years. She currently uses COCs for contraception. She has been doing well with her current method, though would prefer to not take a pill daily. She is interested in LARCs, particularly progestin IUDs or implant. No personal  "history of breast cancer, blood clots, or PID.    She has had left breast tenderness for the past 4 months. She reports it is always the same area of her breast, around 9-11 o'clock. Reports tenderness, sore, pain 4-5/10. Massaging the breast can help reduce pain. No noticed masses, skin changes, nipple retraction, dimpling, or discharge. Pain/tenderness seemed cyclical at first, though has been constant for the past 2-3 weeks. She does note her periods have been heavier over the past 6 months, which she notes is likely from stress of wedding planning. She does note she had similar pain when she was younger, notes she never had imaging.    She notes premenstrual symptoms of nausea, dizziness, HA, and mood change which occur immediately prior to her period. Symptoms last approximately a day. Patient's last menstrual period was 10/24/2022 (approximate).    Review of Systems   Constitutional: Negative for chills and fever.   HENT: Negative for congestion, ear pain, hearing loss and sore throat.    Eyes: Negative for pain and visual disturbance.   Respiratory: Negative for cough and shortness of breath.    Cardiovascular: Negative for chest pain, palpitations and peripheral edema.   Gastrointestinal: Positive for nausea. Negative for abdominal pain, constipation, diarrhea, heartburn and hematochezia.   Breasts:  Positive for tenderness. Negative for breast mass and discharge.   Genitourinary: Negative for dysuria, frequency, genital sores, hematuria, pelvic pain, urgency, vaginal bleeding and vaginal discharge.   Musculoskeletal: Negative for arthralgias, joint swelling and myalgias.   Skin: Negative for rash.   Neurological: Positive for dizziness and headaches. Negative for weakness and paresthesias.   Psychiatric/Behavioral: Positive for mood changes. The patient is not nervous/anxious.        OBJECTIVE:   /70   Pulse 68   Temp 97.8  F (36.6  C) (Tympanic)   Resp 14   Ht 1.588 m (5' 2.5\")   Wt 67.6 kg (149 " lb)   LMP 10/24/2022 (Approximate)   SpO2 99%   Breastfeeding No   BMI 26.82 kg/m    Physical Exam  GENERAL: healthy, alert and no distress  EYES: EOMs intact, PERRL, conjunctivae and sclerae without erythema or discharge  HENT: ear canals intact without lesion or erythema, TM's intact, grey, no erythema or bulging, nose and mouth without ulcers or lesions  NECK: no adenopathy, no asymmetry, masses, or scars and thyroid smooth to palpation  RESP: lungs clear to auscultation - no rales, rhonchi or wheezes  BREAST: No dimpling or retraction, no masses or tenderness with palpation, no nipple discharge and no palpable axillary masses or adenopathy  CV: regular rate and rhythm, S1 S2, no S3 or S4, no murmur, click or rub, no peripheral edema and peripheral pulses strong  ABDOMEN: soft, nontender, no hepatosplenomegaly, no masses and bowel sounds active   (female): female external genitalia intact without lesion, urethral meatus intact, vaginal mucosa pink, moist, well rugated, and cervical mucosa intact without lesion, physiologic discharge present  MS: no gross musculoskeletal defects noted, BUE and BLE strength 5/5  SKIN: no suspicious lesions or rashes  NEURO: CN II-XII intact, mentation and speech intact  PSYCH: engaged, affect bright      ASSESSMENT/PLAN:   Jeanna was seen today for physical, contraception and breast problem.    Diagnoses and all orders for this visit:    Routine general medical examination at a health care facility  Cervical cancer screening  Healthy female exam completed today. Discussed lifestyle modifications including weight loss/ management, eating a balanced diet, and getting regular cardiovascular exercise. Discussed self breast exams and how to complete these. Pap Smear updated today. Labs not needed this year. Plan yearly annual physicals or sooner as needed.  -     Pap Screen reflex to HPV if ASCUS - recommend age 25 - 29    Contraceptive Counseling  Reviewed contraceptive methods  "and risks/benefits including CHCs, LARCs. Jeanna believes she would want the Nexplanon implant, though wishes to defer scheduling for next year when she has new insurance. Reviewed that she will need to schedule with a provider who performs Nexplanon implant.    Breast tenderness  Reported breast tenderness over the past 4 months, initially cyclical now non-cyclical, possible hormonal component. Breast exam unremarkable. Discussed options including US vs monitoring. As she notes recent stressors with wedding planning and concurrent changes to menstrual cycle prior to onset of breast pain, she opts to continue monitoring at this time now that stressors have reduced. If pain persists, she will update the clinic and left breast US will be ordered.    Other orders  -     REVIEW OF HEALTH MAINTENANCE PROTOCOL ORDERS        Patient has been advised of split billing requirements and indicates understanding: Yes      COUNSELING:  Reviewed preventive health counseling, as reflected in patient instructions  Special attention given to:        Contraception       Family planning    Estimated body mass index is 26.82 kg/m  as calculated from the following:    Height as of this encounter: 1.588 m (5' 2.5\").    Weight as of this encounter: 67.6 kg (149 lb).    Weight management plan: Discussed healthy diet and exercise guidelines Patient exercising routinely.    She reports that she has never smoked. She has never used smokeless tobacco.      Counseling Resources:  ATP IV Guidelines  Pooled Cohorts Equation Calculator  Breast Cancer Risk Calculator  BRCA-Related Cancer Risk Assessment: FHS-7 Tool  FRAX Risk Assessment  ICSI Preventive Guidelines  Dietary Guidelines for Americans, 2010  USDA's MyPlate  ASA Prophylaxis  Lung CA Screening    Citlaly Hawkins, LASHONDA CNP  [facility]  "

## 2022-11-18 PROBLEM — R87.612 LGSIL ON PAP SMEAR OF CERVIX: Status: ACTIVE | Noted: 2017-01-01

## 2022-11-18 PROBLEM — R87.622 LGSIL PAP SMEAR OF VAGINA: Status: ACTIVE | Noted: 2017-01-01

## 2022-11-18 LAB
BKR LAB AP GYN ADEQUACY: NORMAL
BKR LAB AP GYN INTERPRETATION: NORMAL
BKR LAB AP HPV REFLEX: NORMAL
BKR LAB AP LMP: NORMAL
BKR LAB AP PREVIOUS ABNL DX: NORMAL
BKR LAB AP PREVIOUS ABNORMAL: NORMAL
PATH REPORT.COMMENTS IMP SPEC: NORMAL
PATH REPORT.COMMENTS IMP SPEC: NORMAL
PATH REPORT.RELEVANT HX SPEC: NORMAL

## 2022-11-22 LAB
HUMAN PAPILLOMA VIRUS 16 DNA: NEGATIVE
HUMAN PAPILLOMA VIRUS 18 DNA: NEGATIVE
HUMAN PAPILLOMA VIRUS FINAL DIAGNOSIS: NORMAL
HUMAN PAPILLOMA VIRUS OTHER HR: NEGATIVE

## 2023-01-03 ENCOUNTER — MYC MEDICAL ADVICE (OUTPATIENT)
Dept: FAMILY MEDICINE | Facility: CLINIC | Age: 28
End: 2023-01-03

## 2023-02-07 DIAGNOSIS — Z30.41 ENCOUNTER FOR SURVEILLANCE OF CONTRACEPTIVE PILLS: ICD-10-CM

## 2023-02-07 RX ORDER — NORGESTREL AND ETHINYL ESTRADIOL 0.3-0.03MG
1 KIT ORAL DAILY
Qty: 84 TABLET | Refills: 3 | Status: SHIPPED | OUTPATIENT
Start: 2023-02-07 | End: 2023-08-28

## 2023-08-27 DIAGNOSIS — Z30.41 ENCOUNTER FOR SURVEILLANCE OF CONTRACEPTIVE PILLS: ICD-10-CM

## 2023-08-27 NOTE — TELEPHONE ENCOUNTER
Medication Question or Refill    Contacts         Type Contact Phone/Fax    08/27/2023 02:27 PM CDT Phone (Incoming) Agata Cheunglevi SUAREZ (Self) [phone] (M)            What medication are you calling about (include dose and sig)?: Low Ogestrel 0.3-30mg    Preferred Pharmacy:   [place] [address] [place] [address] [address]  Phone: [phone] Fax: [phone]      Controlled Substance Agreement on file:   CSA -- Patient Level:    CSA: None found at the patient level.       Who prescribed the medication?: Forslund    Do you need a refill? Yes    When did you use the medication last? She has enough to last until next Sunday.      Patient offered an appointment? Yes: Doesn't need one.      Do you have any questions or concerns?  No      Could we send this information to you in [employer] or would you prefer to receive a phone call?:   Patient would like to be contacted via [employer]

## 2023-08-28 RX ORDER — NORGESTREL AND ETHINYL ESTRADIOL 0.3-0.03MG
1 KIT ORAL DAILY
Qty: 84 TABLET | Refills: 3 | Status: SHIPPED | OUTPATIENT
Start: 2023-08-28 | End: 2024-08-02

## 2023-08-28 NOTE — TELEPHONE ENCOUNTER
"Requested Prescriptions   Pending Prescriptions Disp Refills    LOW-OGESTREL 0.3-30 MG-MCG tablet 84 tablet 3     Sig: Take 1 tablet by mouth daily       Contraceptives Protocol Passed - 8/28/2023 10:40 AM        Passed - Patient is not a current smoker if age is 35 or older        Passed - Recent (12 mo) or future (30 days) visit within the authorizing provider's specialty     Patient has had an office visit with the authorizing provider or a provider within the authorizing providers department within the previous 12 mos or has a future within next 30 days. See \"Patient Info\" tab in inbasket, or \"Choose Columns\" in Meds & Orders section of the refill encounter.              Passed - Medication is active on med list        Passed - No active pregnancy on record        Passed - No positive pregnancy test in past 12 months           Alejandra Valentino RN on 8/28/2023 at 10:42 AM    "

## 2023-12-12 ASSESSMENT — ENCOUNTER SYMPTOMS
CONSTIPATION: 0
SORE THROAT: 0
HEMATOCHEZIA: 0
HEADACHES: 0
ARTHRALGIAS: 0
ABDOMINAL PAIN: 0
HEARTBURN: 0
EYE PAIN: 0
DYSURIA: 0
FREQUENCY: 0
MYALGIAS: 0
HEMATURIA: 0
CHILLS: 0
DIZZINESS: 0
JOINT SWELLING: 0
FEVER: 0
PALPITATIONS: 0
NAUSEA: 0
SHORTNESS OF BREATH: 0
DIARRHEA: 0
NERVOUS/ANXIOUS: 0
BREAST MASS: 0
WEAKNESS: 0
PARESTHESIAS: 0
COUGH: 0

## 2023-12-15 ENCOUNTER — LAB (OUTPATIENT)
Dept: LAB | Facility: CLINIC | Age: 28
End: 2023-12-15
Payer: COMMERCIAL

## 2023-12-15 ENCOUNTER — OFFICE VISIT (OUTPATIENT)
Dept: FAMILY MEDICINE | Facility: CLINIC | Age: 28
End: 2023-12-15
Payer: COMMERCIAL

## 2023-12-15 VITALS
DIASTOLIC BLOOD PRESSURE: 64 MMHG | RESPIRATION RATE: 16 BRPM | HEART RATE: 67 BPM | OXYGEN SATURATION: 98 % | HEIGHT: 63 IN | WEIGHT: 159 LBS | TEMPERATURE: 97.2 F | BODY MASS INDEX: 28.17 KG/M2 | SYSTOLIC BLOOD PRESSURE: 108 MMHG

## 2023-12-15 DIAGNOSIS — Z13.1 SCREENING FOR DIABETES MELLITUS: ICD-10-CM

## 2023-12-15 DIAGNOSIS — Z00.00 ROUTINE GENERAL MEDICAL EXAMINATION AT A HEALTH CARE FACILITY: ICD-10-CM

## 2023-12-15 DIAGNOSIS — Z13.220 SCREENING FOR HYPERLIPIDEMIA: ICD-10-CM

## 2023-12-15 DIAGNOSIS — Z00.00 ROUTINE GENERAL MEDICAL EXAMINATION AT A HEALTH CARE FACILITY: Primary | ICD-10-CM

## 2023-12-15 DIAGNOSIS — Z13.0 SCREENING FOR DEFICIENCY ANEMIA: ICD-10-CM

## 2023-12-15 LAB
ALBUMIN SERPL BCG-MCNC: 4.6 G/DL (ref 3.5–5.2)
ALP SERPL-CCNC: 77 U/L (ref 40–150)
ALT SERPL W P-5'-P-CCNC: 15 U/L (ref 0–50)
ANION GAP SERPL CALCULATED.3IONS-SCNC: 10 MMOL/L (ref 7–15)
AST SERPL W P-5'-P-CCNC: 18 U/L (ref 0–45)
BILIRUB SERPL-MCNC: 0.3 MG/DL
BUN SERPL-MCNC: 14.7 MG/DL (ref 6–20)
CALCIUM SERPL-MCNC: 9.7 MG/DL (ref 8.6–10)
CHLORIDE SERPL-SCNC: 106 MMOL/L (ref 98–107)
CHOLEST SERPL-MCNC: 166 MG/DL
CREAT SERPL-MCNC: 0.88 MG/DL (ref 0.51–0.95)
DEPRECATED HCO3 PLAS-SCNC: 26 MMOL/L (ref 22–29)
EGFRCR SERPLBLD CKD-EPI 2021: >90 ML/MIN/1.73M2
ERYTHROCYTE [DISTWIDTH] IN BLOOD BY AUTOMATED COUNT: 12.2 % (ref 10–15)
FASTING STATUS PATIENT QL REPORTED: NO
GLUCOSE SERPL-MCNC: 84 MG/DL (ref 70–99)
HCT VFR BLD AUTO: 43.6 % (ref 35–47)
HDLC SERPL-MCNC: 63 MG/DL
HGB BLD-MCNC: 14.6 G/DL (ref 11.7–15.7)
LDLC SERPL CALC-MCNC: 86 MG/DL
MCH RBC QN AUTO: 30 PG (ref 26.5–33)
MCHC RBC AUTO-ENTMCNC: 33.5 G/DL (ref 31.5–36.5)
MCV RBC AUTO: 90 FL (ref 78–100)
NONHDLC SERPL-MCNC: 103 MG/DL
PLATELET # BLD AUTO: 264 10E3/UL (ref 150–450)
POTASSIUM SERPL-SCNC: 5 MMOL/L (ref 3.4–5.3)
PROT SERPL-MCNC: 7.3 G/DL (ref 6.4–8.3)
RBC # BLD AUTO: 4.87 10E6/UL (ref 3.8–5.2)
SODIUM SERPL-SCNC: 142 MMOL/L (ref 135–145)
TRIGL SERPL-MCNC: 86 MG/DL
VIT D+METAB SERPL-MCNC: 38 NG/ML (ref 20–50)
WBC # BLD AUTO: 6.6 10E3/UL (ref 4–11)

## 2023-12-15 PROCEDURE — 82306 VITAMIN D 25 HYDROXY: CPT

## 2023-12-15 PROCEDURE — 36415 COLL VENOUS BLD VENIPUNCTURE: CPT

## 2023-12-15 PROCEDURE — 85027 COMPLETE CBC AUTOMATED: CPT

## 2023-12-15 PROCEDURE — 80053 COMPREHEN METABOLIC PANEL: CPT

## 2023-12-15 PROCEDURE — 99395 PREV VISIT EST AGE 18-39: CPT | Performed by: NURSE PRACTITIONER

## 2023-12-15 PROCEDURE — 80061 LIPID PANEL: CPT

## 2023-12-15 ASSESSMENT — ENCOUNTER SYMPTOMS
JOINT SWELLING: 0
DYSURIA: 0
ARTHRALGIAS: 0
HEARTBURN: 0
HEMATOCHEZIA: 0
COUGH: 0
DIZZINESS: 0
EYE PAIN: 0
FEVER: 0
BREAST MASS: 0
SHORTNESS OF BREATH: 0
SORE THROAT: 0
CONSTIPATION: 0
CHILLS: 0
HEMATURIA: 0
MYALGIAS: 0
HEADACHES: 0
NAUSEA: 0
PARESTHESIAS: 0
DIARRHEA: 0
FREQUENCY: 0
WEAKNESS: 0
PALPITATIONS: 0
ABDOMINAL PAIN: 0
NERVOUS/ANXIOUS: 0

## 2023-12-15 ASSESSMENT — PAIN SCALES - GENERAL: PAINLEVEL: NO PAIN (0)

## 2023-12-15 NOTE — PROGRESS NOTES
SUBJECTIVE:   Jeanna is a 28 year old, presenting for the following:  Physical        12/15/2023     9:17 AM   Additional Questions   Roomed by Catherine JOHNSON MA   Accompanied by Self       Healthy Habits:     Getting at least 3 servings of Calcium per day:  Yes    Bi-annual eye exam:  Yes    Dental care twice a year:  Yes    Sleep apnea or symptoms of sleep apnea:  None    Diet:  Regular (no restrictions)    Frequency of exercise:  2-3 days/week    Duration of exercise:  30-45 minutes    Taking medications regularly:  Yes    Barriers to taking medications:  None    Medication side effects:  None    Additional concerns today:  No    *Left breast pain that has been on-going for about 10 years-has been discussed before. She has been tracking this pain and it around day 6-10 after starting a new pill pack has been the most consistent. She feels it is cyclic at this time. No lumps are present. Hasn't changed. No family history of breast cancer.     Today's PHQ-2 Score:       12/15/2023     9:16 AM   PHQ-2 ( 1999 Pfizer)   Q1: Little interest or pleasure in doing things 0   Q2: Feeling down, depressed or hopeless 0   PHQ-2 Score 0   Q1: Little interest or pleasure in doing things Not at all   Q2: Feeling down, depressed or hopeless Not at all   PHQ-2 Score 0         Social History     Tobacco Use    Smoking status: Never    Smokeless tobacco: Never   Substance Use Topics    Alcohol use: Not Currently             12/12/2023    10:50 AM   Alcohol Use   Prescreen: >3 drinks/day or >7 drinks/week? No     Reviewed orders with patient.  Reviewed health maintenance and updated orders accordingly - Yes  Labs reviewed in EPIC  BP Readings from Last 3 Encounters:   12/15/23 108/64   11/14/22 122/70   11/12/21 124/75    Wt Readings from Last 3 Encounters:   12/15/23 72.1 kg (159 lb)   11/14/22 67.6 kg (149 lb)   11/12/21 66.7 kg (147 lb)                  Patient Active Problem List   Diagnosis    LGSIL on Pap smear of cervix     History  reviewed. No pertinent surgical history.    Social History     Tobacco Use    Smoking status: Never    Smokeless tobacco: Never   Substance Use Topics    Alcohol use: Not Currently     Family History   Problem Relation Age of Onset    Heart Disease Father         Angioplasty    Breast Cancer Paternal Grandmother          Current Outpatient Medications   Medication Sig Dispense Refill    LOW-OGESTREL 0.3-30 MG-MCG tablet Take 1 tablet by mouth daily 84 tablet 3     No Known Allergies    Breast Cancer Screening:    FHS-7:       11/12/2021     8:42 AM 11/14/2022    10:41 AM 12/12/2023    10:51 AM   Breast CA Risk Assessment (FHS-7)   Did any of your first-degree relatives have breast or ovarian cancer? No No Yes   Did any of your relatives have bilateral breast cancer? Yes Yes Unknown   Did any man in your family have breast cancer?  No No   Did any woman in your family have breast and ovarian cancer?  Yes Yes   Did any woman in your family have breast cancer before age 50 y?  No No   Do you have 2 or more relatives with breast and/or ovarian cancer?  No No   Do you have 2 or more relatives with breast and/or bowel cancer?  No No     Patient under 40 years of age: Routine Mammogram Screening not recommended.   Pertinent mammograms are reviewed under the imaging tab.    History of abnormal Pap smear: NO - age 21-29 PAP every 3 years recommended      Latest Ref Rng & Units 11/14/2022    12:13 PM 7/19/2019     2:52 PM   PAP / HPV   PAP  Negative for Intraepithelial Lesion or Malignancy (NILM)  Negative for squamous intraepithelial lesion or malignancy  Electronically signed by Marina Coronel CT (ASCP) on 7/30/2019 at  8:22 AM      HPV 16 DNA Negative Negative  Negative    HPV 18 DNA Negative Negative  Negative    Other HR HPV Negative Negative  Negative      Reviewed and updated as needed this visit by clinical staff   Tobacco  Allergies  Meds              Reviewed and updated as needed this visit by Provider     "             History reviewed. No pertinent past medical history.   History reviewed. No pertinent surgical history.    Review of Systems   Constitutional:  Negative for chills and fever.   HENT:  Negative for congestion, ear pain, hearing loss and sore throat.    Eyes:  Negative for pain and visual disturbance.   Respiratory:  Negative for cough and shortness of breath.    Cardiovascular:  Negative for chest pain, palpitations and peripheral edema.   Gastrointestinal:  Negative for abdominal pain, constipation, diarrhea, heartburn, hematochezia and nausea.   Breasts:  Negative for tenderness, breast mass and discharge.   Genitourinary:  Negative for dysuria, frequency, genital sores, hematuria, pelvic pain, urgency, vaginal bleeding and vaginal discharge.   Musculoskeletal:  Negative for arthralgias, joint swelling and myalgias.   Skin:  Negative for rash.   Neurological:  Negative for dizziness, weakness, headaches and paresthesias.   Psychiatric/Behavioral:  Negative for mood changes. The patient is not nervous/anxious.       OBJECTIVE:   /64   Pulse 67   Temp 97.2  F (36.2  C) (Tympanic)   Resp 16   Ht 1.588 m (5' 2.5\")   Wt 72.1 kg (159 lb)   LMP 11/16/2023 (Exact Date)   SpO2 98%   BMI 28.62 kg/m      Physical Exam  GENERAL: healthy, alert and no distress  EYES: Eyes grossly normal to inspection, PERRL and conjunctivae and sclerae normal  HENT: ear canals and TM's normal, nose and mouth without ulcers or lesions  NECK: no adenopathy, no asymmetry, masses, or scars and thyroid normal to palpation  RESP: lungs clear to auscultation - no rales, rhonchi or wheezes  BREAST: normal without masses, tenderness or nipple discharge and no palpable axillary masses or adenopathy  CV: regular rate and rhythm, normal S1 S2, no S3 or S4, no murmur, click or rub, no peripheral edema and peripheral pulses strong  ABDOMEN: soft, nontender, no hepatosplenomegaly, no masses and bowel sounds normal  MS: no gross " musculoskeletal defects noted, no edema  SKIN: no suspicious lesions or rashes  NEURO: Normal strength and tone, mentation intact and speech normal  PSYCH: mentation appears normal, affect normal/bright  LYMPH: no cervical, supraclavicular, axillary, or inguinal adenopathy    ASSESSMENT/PLAN:       ICD-10-CM    1. Routine general medical examination at a health care facility  Z00.00 Vitamin D Deficiency      2. Screening for hyperlipidemia  Z13.220 Lipid panel reflex to direct LDL Fasting      3. Screening for deficiency anemia  Z13.0 CBC with platelets      4. Screening for diabetes mellitus  Z13.1 Comprehensive metabolic panel (BMP + Alb, Alk Phos, ALT, AST, Total. Bili, TP)          Healthy female exam completed today. Discussed lifestyle modifications including weight loss/ management, eating a balanced diet, and getting regular cardiovascular exercise. Discussed self breast exams and how to complete these. Pap Smear up to date. Labs reviewed. Plan yearly annual physicals or sooner as needed.      Patient has been advised of split billing requirements and indicates understanding: Yes      COUNSELING:  Reviewed preventive health counseling, as reflected in patient instructions       Regular exercise       Healthy diet/nutrition       Contraception       Family planning       Folic Acid        She reports that she has never smoked. She has never used smokeless tobacco.          Citlaly Hawkins, LASHONDA CNP  M [facility]

## 2024-04-02 ENCOUNTER — MYC MEDICAL ADVICE (OUTPATIENT)
Dept: FAMILY MEDICINE | Facility: CLINIC | Age: 29
End: 2024-04-02
Payer: COMMERCIAL

## 2024-04-18 ENCOUNTER — OFFICE VISIT (OUTPATIENT)
Dept: FAMILY MEDICINE | Facility: CLINIC | Age: 29
End: 2024-04-18
Payer: COMMERCIAL

## 2024-04-18 VITALS
DIASTOLIC BLOOD PRESSURE: 68 MMHG | SYSTOLIC BLOOD PRESSURE: 122 MMHG | HEART RATE: 66 BPM | HEIGHT: 63 IN | TEMPERATURE: 97.2 F | RESPIRATION RATE: 16 BRPM | BODY MASS INDEX: 28.17 KG/M2 | WEIGHT: 159 LBS | OXYGEN SATURATION: 98 %

## 2024-04-18 DIAGNOSIS — N64.4 BREAST PAIN: Primary | ICD-10-CM

## 2024-04-18 PROCEDURE — 99213 OFFICE O/P EST LOW 20 MIN: CPT | Performed by: NURSE PRACTITIONER

## 2024-04-18 ASSESSMENT — PAIN SCALES - GENERAL: PAINLEVEL: NO PAIN (0)

## 2024-04-18 NOTE — PROGRESS NOTES
"  Assessment & Plan     Breast pain  Likely secondary to dense breast tissue.  However further evaluation warranted.  Orders placed for mammogram and ultrasound.  - US Breast Left Limited 1-3 Quadrants; Future  - MA Diagnostic Digital Bilateral; Future        BMI  Estimated body mass index is 28.62 kg/m  as calculated from the following:    Height as of this encounter: 1.588 m (5' 2.5\").    Weight as of this encounter: 72.1 kg (159 lb).     Elisabeth Meeks is a 29 year old, presenting for the following health issues:  Breast Pain        4/18/2024     7:40 AM   Additional Questions   Roomed by Catherine JOHNSON MA     History of Present Illness       Reason for visit:  Left breast pain  Symptom onset:  More than a month  Symptoms include:  Breast pain  Symptom intensity:  Mild  Symptom progression:  Worsening  Had these symptoms before:  Yes  Has tried/received treatment for these symptoms:  No  What makes it worse:  No  What makes it better:  Working out    She eats 2-3 servings of fruits and vegetables daily.She consumes 0 sweetened beverage(s) daily.She exercises with enough effort to increase her heart rate 30 to 60 minutes per day.  She exercises with enough effort to increase her heart rate 4 days per week.   She is taking medications regularly.     Breast Concern  Onset/Duration: Couple of years-but seeming to be more constant in the last couple months.   Description:   Location: Left breast-feels like the pain rotates between left and right side of breast  Pain or tenderness: YES  Redness:  no   Intensity: mild  Progression of Symptoms: worsening  Accompanying Signs & Symptoms:  Any lumps in axillary region:  no   Movable:  no   Nipple discharge: None  Changes in the skin or nipple: None  On Hormone therapy: YES  Does it change with menstrual cycle: YES- At first was cycle related  Previous history of similar problem: None  First degree relative with breast cancer: a positive family history for breast cancer in her " "paternal grandmother.  Precipitating factors:           Worsened by: None  Alleviating factors:            Improved by: When she works out, feels like the pain does go away.  Therapies tried and outcome: None  Patient's last menstrual period was 04/11/2024 (exact date).    Left breast pain persistent and present nearly constant. Prior was intermittent with cycles.     Having some after mensis spotting 1 week later.       Review of Systems  Constitutional, HEENT, cardiovascular, pulmonary, gi and gu systems are negative, except as otherwise noted.      Objective    /68   Pulse 66   Temp 97.2  F (36.2  C) (Tympanic)   Resp 16   Ht 1.588 m (5' 2.5\")   Wt 72.1 kg (159 lb)   LMP 04/11/2024 (Exact Date)   SpO2 98%   BMI 28.62 kg/m    Body mass index is 28.62 kg/m .  Physical Exam   GENERAL: alert and no distress  BREAST: normal without masses.  Mild left breast tenderness on exam.  Otherwise no right breast tenderness. No nipple discharge and no palpable axillary masses or adenopathy            Signed Electronically by: LASHONDA Sewell CNP    "

## 2024-04-22 ENCOUNTER — ANCILLARY PROCEDURE (OUTPATIENT)
Dept: MAMMOGRAPHY | Facility: CLINIC | Age: 29
End: 2024-04-22
Attending: NURSE PRACTITIONER
Payer: COMMERCIAL

## 2024-04-22 DIAGNOSIS — N64.4 BREAST PAIN: ICD-10-CM

## 2024-04-22 PROCEDURE — 76642 ULTRASOUND BREAST LIMITED: CPT | Mod: LT | Performed by: RADIOLOGY

## 2024-08-01 DIAGNOSIS — Z30.41 ENCOUNTER FOR SURVEILLANCE OF CONTRACEPTIVE PILLS: ICD-10-CM

## 2024-08-02 RX ORDER — NORGESTREL AND ETHINYL ESTRADIOL 0.3-0.03MG
1 KIT ORAL DAILY
Qty: 84 TABLET | Refills: 1 | Status: SHIPPED | OUTPATIENT
Start: 2024-08-02

## 2024-08-02 NOTE — TELEPHONE ENCOUNTER
Pending Prescriptions:                       Disp   Refills    LOW-OGESTREL 0.3-30 MG-MCG tablet [Pharmac*84 tab*1        Sig: TAKE 1 TABLET BY MOUTH EVERY DAY    Routing refill request to provider for review/approval because:  Requested Prescriptions   Pending Prescriptions Disp Refills    LOW-OGESTREL 0.3-30 MG-MCG tablet [Pharmacy Med Name: LOW-OGESTREL-28 TABLET] 84 tablet 1     Sig: TAKE 1 TABLET BY MOUTH EVERY DAY       Contraceptives Protocol Failed - 8/1/2024 12:44 AM        Failed - Medication indicated for associated diagnosis     Medication is associated with one or more of the following diagnoses:  Contraception  Acne  Dysmenorrhea  Menorrhagia  Amenorrhea  PCOS  Premenstrual Dysphoric Disorder  Irregular menses  Endometriosis          Passed - Patient is not a current smoker if age is 35 or older        Passed - Recent (12 mo) or future (30 days) visit within the authorizing provider's specialty     The patient must have completed an in-person or virtual visit within the past 12 months or has a future visit scheduled within the next 90 days with the authorizing provider s specialty.  Urgent care and e-visits do not quality as an office visit for this protocol.          Passed - Medication is active on med list        Passed - No active pregnancy on record        Passed - No positive pregnancy test in past 12 months           Netta Cordova RN  [facility]

## 2024-11-15 ENCOUNTER — PATIENT OUTREACH (OUTPATIENT)
Dept: CARE COORDINATION | Facility: CLINIC | Age: 29
End: 2024-11-15
Payer: COMMERCIAL

## 2025-02-09 ENCOUNTER — HEALTH MAINTENANCE LETTER (OUTPATIENT)
Age: 30
End: 2025-02-09